# Patient Record
Sex: MALE | Race: WHITE | NOT HISPANIC OR LATINO | Employment: OTHER | URBAN - METROPOLITAN AREA
[De-identification: names, ages, dates, MRNs, and addresses within clinical notes are randomized per-mention and may not be internally consistent; named-entity substitution may affect disease eponyms.]

---

## 2020-08-03 ENCOUNTER — HOSPITAL ENCOUNTER (OUTPATIENT)
Dept: PHYSICAL THERAPY | Facility: CLINIC | Age: 60
Setting detail: RECURRING SERIES
Discharge: HOME | End: 2020-12-14

## 2020-08-13 ENCOUNTER — OFFICE VISIT CONVERTED (OUTPATIENT)
Dept: ORTHOPEDIC SURGERY | Facility: CLINIC | Age: 60
End: 2020-08-13
Attending: ORTHOPAEDIC SURGERY

## 2020-09-24 ENCOUNTER — OFFICE VISIT CONVERTED (OUTPATIENT)
Dept: ORTHOPEDIC SURGERY | Facility: CLINIC | Age: 60
End: 2020-09-24
Attending: PHYSICIAN ASSISTANT

## 2020-11-05 ENCOUNTER — OFFICE VISIT CONVERTED (OUTPATIENT)
Dept: ORTHOPEDIC SURGERY | Facility: CLINIC | Age: 60
End: 2020-11-05
Attending: PHYSICIAN ASSISTANT

## 2020-11-24 ENCOUNTER — OFFICE VISIT (OUTPATIENT)
Dept: FAMILY MEDICINE CLINIC | Facility: CLINIC | Age: 60
End: 2020-11-24

## 2020-11-24 VITALS
BODY MASS INDEX: 39.19 KG/M2 | OXYGEN SATURATION: 97 % | TEMPERATURE: 98.2 F | RESPIRATION RATE: 18 BRPM | HEART RATE: 83 BPM | DIASTOLIC BLOOD PRESSURE: 82 MMHG | WEIGHT: 199.6 LBS | HEIGHT: 60 IN | SYSTOLIC BLOOD PRESSURE: 140 MMHG

## 2020-11-24 DIAGNOSIS — F51.01 PRIMARY INSOMNIA: ICD-10-CM

## 2020-11-24 DIAGNOSIS — Z63.79 STRESSFUL LIFE EVENTS AFFECTING FAMILY AND HOUSEHOLD: Primary | ICD-10-CM

## 2020-11-24 PROBLEM — M48.02 STENOSIS, CERVICAL SPINE: Status: ACTIVE | Noted: 2020-11-24

## 2020-11-24 PROBLEM — R97.20 ELEVATED PSA: Status: ACTIVE | Noted: 2020-11-24

## 2020-11-24 PROCEDURE — 99202 OFFICE O/P NEW SF 15 MIN: CPT | Performed by: FAMILY MEDICINE

## 2020-11-24 RX ORDER — FINASTERIDE 5 MG/1
5 TABLET, FILM COATED ORAL DAILY
COMMUNITY

## 2020-11-24 RX ORDER — TAMSULOSIN HYDROCHLORIDE 0.4 MG/1
1 CAPSULE ORAL DAILY
COMMUNITY

## 2020-11-24 RX ORDER — IBUPROFEN 800 MG/1
800 TABLET ORAL EVERY 6 HOURS PRN
COMMUNITY

## 2020-11-24 RX ORDER — ROSUVASTATIN CALCIUM 10 MG/1
10 TABLET, COATED ORAL DAILY
COMMUNITY

## 2020-11-24 RX ORDER — TRAZODONE HYDROCHLORIDE 50 MG/1
TABLET ORAL
Qty: 60 TABLET | Refills: 5 | Status: SHIPPED | OUTPATIENT
Start: 2020-11-24

## 2020-11-24 RX ORDER — ESCITALOPRAM OXALATE 10 MG/1
10 TABLET ORAL DAILY
Qty: 30 TABLET | Refills: 5 | Status: CANCELLED | OUTPATIENT
Start: 2020-11-24

## 2020-11-24 RX ORDER — ZOLPIDEM TARTRATE 12.5 MG/1
12.5 TABLET, FILM COATED, EXTENDED RELEASE ORAL NIGHTLY PRN
COMMUNITY

## 2020-11-24 RX ORDER — HYDROCODONE BITARTRATE AND ACETAMINOPHEN 7.5; 325 MG/1; MG/1
1 TABLET ORAL EVERY 6 HOURS PRN
COMMUNITY

## 2020-11-24 RX ORDER — CELECOXIB 200 MG/1
200 CAPSULE ORAL DAILY
COMMUNITY

## 2020-11-24 RX ORDER — OMEPRAZOLE 20 MG/1
20 CAPSULE, DELAYED RELEASE ORAL DAILY
COMMUNITY

## 2020-11-24 NOTE — PROGRESS NOTES
Subjective   Davis King is a 60 y.o. male.     Davis where to establish care.  Increase life stressors at home  [facility]. [facility] has [facility].      The following portions of the patient's history were reviewed and updated as appropriate: allergies, current medications, past family history, past medical history, past social history, past surgical history and problem list.    Patient Active Problem List   Diagnosis   • Elevated PSA   • Stenosis, cervical spine       Current Outpatient Medications on File Prior to Visit   Medication Sig Dispense Refill   • celecoxib (CeleBREX) 200 MG capsule Take 200 mg by mouth Daily.     • Diclofenac Sodium (VOLTAREN) 1 % gel gel Apply 4 g topically to the appropriate area as directed 4 (Four) Times a Day As Needed.     • finasteride (PROSCAR) 5 MG tablet Take 5 mg by mouth Daily.     • fluticasone (VERAMYST) 27.5 MCG/SPRAY nasal spray 2 sprays into the nostril(s) as directed by provider Daily.     • HYDROcodone-acetaminophen (NORCO) 7.5-325 MG per tablet Take 1 tablet by mouth Every 6 (Six) Hours As Needed for Moderate Pain .     • ibuprofen (ADVIL,MOTRIN) 800 MG tablet Take 800 mg by mouth Every 6 (Six) Hours As Needed for Mild Pain .     • metoprolol tartrate (LOPRESSOR) 25 MG tablet Take 25 mg by mouth 2 (Two) Times a Day. 12.5 mg qd     • omeprazole (priLOSEC) 20 MG capsule Take 20 mg by mouth Daily.     • rosuvastatin (CRESTOR) 10 MG tablet Take 10 mg by mouth Daily.     • tamsulosin (FLOMAX) 0.4 MG capsule 24 hr capsule Take 1 capsule by mouth Daily.     • zolpidem CR (AMBIEN CR) 12.5 MG CR tablet Take 12.5 mg by mouth At Night As Needed for Sleep.       No current facility-administered medications on file prior to visit.      Current outpatient and discharge medications have been reconciled for the patient.  Reviewed by: Rory Hawkins MD      Allergies   Allergen Reactions   • Nitroglycerin Nausea Only   • Tramadol Itching       Review of  Systems   Constitutional: Negative for activity change, appetite change, fatigue and fever.   HENT: Negative for ear pain, swollen glands and voice change.    Eyes: Negative for visual disturbance.   Respiratory: Negative for shortness of breath and wheezing.    Cardiovascular: Negative for chest pain and leg swelling.   Gastrointestinal: Negative for abdominal pain, blood in stool, constipation, diarrhea, nausea and vomiting.   Endocrine: Negative for polydipsia and polyuria.   Genitourinary: Negative for dysuria, frequency and hematuria.   Musculoskeletal: Negative for joint swelling, neck pain and neck stiffness.   Skin: Negative for rash and bruise.   Neurological: Negative for weakness, numbness and headache.   Psychiatric/Behavioral: Positive for sleep disturbance. Negative for suicidal ideas and depressed mood. The patient is nervous/anxious.      I have reviewed and confirmed the accuracy of the ROS as documented by the MA/LPN/RN Rory Hawkins MD      Objective   Vitals:    11/24/20 1407   BP: 140/82   Pulse: 83   Resp: 18   Temp: 98.2 °F (36.8 °C)   SpO2: 97%     Physical Exam  Constitutional:       Appearance: He is well-developed.   HENT:      Head: Normocephalic and atraumatic.      Right Ear: External ear normal.      Left Ear: External ear normal.      Nose: Nose normal.   Eyes:      Pupils: Pupils are equal, round, and reactive to light.   Neck:      Musculoskeletal: Normal range of motion and neck supple.   Cardiovascular:      Rate and Rhythm: Normal rate and regular rhythm.      Heart sounds: Normal heart sounds.   Pulmonary:      Effort: Pulmonary effort is normal.      Breath sounds: Normal breath sounds.   Abdominal:      General: Bowel sounds are normal.      Palpations: Abdomen is soft.   Musculoskeletal: Normal range of motion.   Skin:     General: Skin is warm and dry.   Neurological:      Mental Status: He is alert and oriented to person, place, and time.   Psychiatric:          Behavior: Behavior normal.         Thought Content: Thought content normal.         Judgment: Judgment normal.       I wore protective equipment throughout this patient encounter to include mask and eye protection. Hand hygiene was performed before donning protective equipment and after removal when leaving the room.    Assessment/Plan .  Problem List Items Addressed This Visit     None      Visit Diagnoses     Stressful life events affecting family and household    -  Primary    Relevant Medications    zolpidem CR (AMBIEN CR) 12.5 MG CR tablet    traZODone (DESYREL) 50 MG tablet    Primary insomnia        Relevant Medications    traZODone (DESYREL) 50 MG tablet       Consider starting SSRI if sx persist.  Findings discussed. All questions answered.  Medication and medication adverse effects discussed.  Drug education given and explained to patient. Patient verbalized understanding.  Continue ambien, add trazodone.  Follow-up for routine health maintenance as directed

## 2021-05-10 NOTE — H&P
History and Physical      Patient Name: Davis King   Patient ID: [id number]   Sex: Male   YOB: 1960        Visit Date: August 13, 2020    Provider: Srinivasan Mitchell MD   Location: [facility]   Location Address: [address] [place] [address]  [id number]   Location Phone: [phone]          Chief Complaint  · left shoulder pain      History Of Present Illness  Davis King is a 59 year old /White male who presents today to [facility].      The patient presents today for left shoulder evaluation today. He is having cracking and popping in his shoulder with mild pain. [facility]       Past Medical History  Cervicalgia; Insomnia; Pain, Back; Pain, Cervical Spine; Pain, Chest; Pain, Chronic Pain Syndrome; Pain, Thoracic Spine; Prostate Disorder; Restless leg syndrome         Past Surgical History  ACL Reconstruction; biceps tendon repair; Colonoscopy; Metal implants; Prostate Biopsy         Medication List  Ambien CR 12.5 mg oral tablet,ext release multiphase; Celebrex 200 mg oral capsule; finasteride 5 mg oral tablet; Glucosamine Chondroitin PLUS 910-969-63-54 mg oral capsule; hydrocodone-acetaminophen 7.5-300 mg oral tablet; ibuprofen 800 mg oral tablet; metoprolol succinate 25 mg oral tablet extended release 24 hr; omeprazole 20 mg oral capsule,delayed release(DR/EC); rosuvastatin 10 mg oral tablet; tamsulosin 0.4 mg oral capsule         Allergy List  NO KNOWN DRUG ALLERGIES; tramadol         Family Medical History  Cancer, Unspecified; Diabetes, unspecified type; Spine Problems; Osteoporosis         Social History  Alcohol Use (Current some day); lives with spouse; .; Recreational Drug Use (Never); Retired.; Tobacco (Never)         Review of Systems  · Constitutional  o Denies  o : fever, chills, weight loss  · Cardiovascular  o Denies  o : chest pain, shortness of breath  · Gastrointestinal  o Denies  o : liver  disease, heartburn, nausea, blood in stools  · Genitourinary  o Denies  o : painful urination, blood in urine  · Integument  o Denies  o : rash, itching  · Neurologic  o Denies  o : headache, weakness, loss of consciousness  · Musculoskeletal  o Denies  o : painful, swollen joints  · Psychiatric  o Denies  o : drug/alcohol addiction, anxiety, depression      Vitals  Date Time BP Position Site L\R Cuff Size HR RR TEMP (F) WT  HT  BMI kg/m2 BSA m2 O2 Sat        08/13/2020 08:48 AM      56 - R   200lbs 2oz 6'   27.14 2.15 98 %          Physical Examination  · Constitutional  o Appearance  o : well developed, well-nourished, no obvious deformities present  · Head and Face  o Head  o :   § Inspection  § : normocephalic  o Face  o :   § Inspection  § : no facial lesions  · Eyes  o Conjunctivae  o : conjunctivae normal  o Sclerae  o : sclerae white  · Ears, Nose, Mouth and Throat  o Ears  o :   § External Ears  § : appearance within normal limits  § Hearing  § : intact  o Nose  o :   § External Nose  § : appearance normal  · Neck  o Inspection/Palpation  o : normal appearance  o Range of Motion  o : full range of motion  · Respiratory  o Respiratory Effort  o : breathing unlabored  o Inspection of Chest  o : normal appearance  o Auscultation of Lungs  o : no audible wheezing or rales  · Cardiovascular  o Heart  o : regular rate  · Gastrointestinal  o Abdominal Examination  o : soft and non-tender  · Skin and Subcutaneous Tissue  o General Inspection  o : intact, no rashes  · Psychiatric  o General  o : Alert and oriented x3  o Judgement and Insight  o : judgment and insight intact  o Mood and Affect  o : mood normal, affect appropriate  · Left Shoulder  o Inspection  o : Tender over the anterior lateral shoulder, FE: 175, AB: 145, ER: 90 crepitus with ROM, IR: 50, ER at he side is 80, reaches to T12, negative lift off, 4 + supraspinatus, positive impingement signs, negative cross arm adduction.   · Injection  Note/Aspiration Note  o Site  o : left shoulder  o Procedure  o : Procedure: After educating the patient, patient gave consent for procedure. After using Chloraprep, the joint space was injected. The patient tolerated the procedure well.  o Medication  o : 80 mg of DepoMedrol with 9cc of 1% Lidocaine              Assessment  · AC (acromioclavicular) arthritis on the left shoulder     716.91/M19.019  · Bursitis of the left shoulder     727.3/M71.9  · Left shoulder pain, unspecified chronicity     719.41/M25.512  · Tendinitis of the right shoulder     726.90/M77.9  · Impingement syndrome of shoulder     726.2/M75.40      Plan  · Orders  o Depo-Medrol injection 80mg () - - 08/13/2020   Lot [id number] Exp 06 2021 [employer] Administered by JESSICA MARTINEZ MD   o Shoulder Intra-articular Injection without US Guidance [facility] ([id number]) - - 08/13/2020   Lot [id number] Exp 08 01 2021 [employer] Administered by JESSICA MARTINEZ MD   · Medications  o Medications have been Reconciled  o Transition of Care or Provider Policy  · Instructions  o Dr. Martinez saw and examined the patient and agrees with plan.   o Reviewed the patient's Past Medical, Social, and Family history as well as the ROS at today's visit, no changes.  o Call or return if worsening symptoms.  o The above service was scribed by Gera Vergara on my behalf and I attest to the accuracy of the note. jsb  o We discussed that injections and therapy should help this a lot. We would like him to continue PT and do some home exercises. The patient wishes to proceed with an injection today and tolerated this well.   o Electronically Identified Patient Education Materials Provided Electronically  · Referrals  o ID: [id number] Date: 08/13/2020 Type: Inbound  Specialty: Orthopedic Surgery            Electronically Signed by: Javed Vergara - , Other -Author on August 13, 2020 01:01:50 PM  Electronically Co-signed by: Jessica Martinez MD -Reviewer  on August 13, 2020 09:13:48 PM

## 2021-05-13 NOTE — PROGRESS NOTES
"   Progress Note      Patient Name: Davis King   Patient ID: [id number]   Sex: Male   YOB: 1960    Referring Provider: Srinivasan Mitchell MD    Visit Date: November 5, 2020    Provider: Wally Fraga PA-C   Location: [facility]   Location Address: [address] [place] [address]  [id number]   Location Phone: [phone]          Chief Complaint  · Left shoulder pain      History Of Present Illness  Davis King is a 59 year old /White male who presents today to [facility]. Patient presents for follow-up evaluation of left AC arthritis, tendinitis and bursitis. Patient states he is \"slowly getting better \". He states he has decreased popping in his shoulder, he states he has less pain at night and is able to sleep better. Patient states he has been attending physical therapy but states they are about to release him and he will continue home exercises. Patient states injection he has received in the past did not help and is not requesting a new injection. No new complaints today.       Past Medical History  Cervicalgia; Insomnia; Pain, Back; Pain, Cervical Spine; Pain, Chest; Pain, Chronic Pain Syndrome; Pain, Thoracic Spine; Prostate Disorder; Restless leg syndrome         Past Surgical History  ACL Reconstruction; biceps tendon repair; Colonoscopy; Metal implants; Prostate Biopsy         Medication List  Ambien CR 12.5 mg oral tablet,ext release multiphase; Celebrex 200 mg oral capsule; finasteride 5 mg oral tablet; Glucosamine Chondroitin PLUS 403-546-43-54 mg oral capsule; hydrocodone-acetaminophen 7.5-300 mg oral tablet; ibuprofen 800 mg oral tablet; metoprolol succinate 25 mg oral tablet extended release 24 hr; omeprazole 20 mg oral capsule,delayed release(DR/EC); rosuvastatin 10 mg oral tablet; tamsulosin 0.4 mg oral capsule         Allergy List  NO KNOWN DRUG ALLERGIES; tramadol       Allergies Reconciled  Family Medical History  Cancer, Unspecified; " Diabetes, unspecified type; Spine Problems; Osteoporosis         Social History  Alcohol Use (Current some day); lives with spouse; .; Recreational Drug Use (Never); Retired.; Tobacco (Never)         Review of Systems  · Constitutional  o Denies  o : fever, chills, weight loss  · Cardiovascular  o Denies  o : chest pain, shortness of breath  · Gastrointestinal  o Denies  o : liver disease, heartburn, nausea, blood in stools  · Genitourinary  o Denies  o : painful urination, blood in urine  · Integument  o Denies  o : rash, itching  · Neurologic  o Denies  o : headache, weakness, loss of consciousness  · Musculoskeletal  o Denies  o : painful, swollen joints  · Psychiatric  o Denies  o : drug/alcohol addiction, anxiety, depression      Vitals  Date Time BP Position Site L\R Cuff Size HR RR TEMP (F) WT  HT  BMI kg/m2 BSA m2 O2 Sat FR L/min FiO2 HC       11/05/2020 10:39 AM      55 - R   204lbs 16oz 6'   27.8 2.17 98 %            Physical Examination  · Constitutional  o Appearance  o : well developed, well-nourished, no obvious deformities present  · Head and Face  o Head  o :   § Inspection  § : normocephalic  o Face  o :   § Inspection  § : no facial lesions  · Eyes  o Conjunctivae  o : conjunctivae normal  o Sclerae  o : sclerae white  · Ears, Nose, Mouth and Throat  o Ears  o :   § External Ears  § : appearance within normal limits  § Hearing  § : intact  o Nose  o :   § External Nose  § : appearance normal  · Neck  o Inspection/Palpation  o : normal appearance  o Range of Motion  o : full range of motion  · Respiratory  o Respiratory Effort  o : breathing unlabored  o Inspection of Chest  o : normal appearance  o Auscultation of Lungs  o : no audible wheezing or rales  · Cardiovascular  o Heart  o : regular rate  · Gastrointestinal  o Abdominal Examination  o : soft and non-tender  · Skin and Subcutaneous Tissue  o General Inspection  o : intact, no rashes  · Psychiatric  o General  o : Alert and oriented  x3  o Judgement and Insight  o : judgment and insight intact  o Mood and Affect  o : mood normal, affect appropriate  · Left Shoulder  o Inspection  o : Skin is intact, no erythema, no ecchymosis, no swelling, no signs of infection. Nontender to palpation, forward elevation 175, abduction 145, external rotation with abduction 90, crepitus with range of motion. Internal rotation 50, external rotation at side is 80, internal rotation to T12, negative liftoff test, 4+ supraspinatus, positive impingement signs, negative crossarm adduction.   · Imaging  o Imaging  o : MRI left shoulder, 3/25/2020, impression: 1 tendinopathy of the supraspinatus, infraspinatus and subscapularis of the left shoulder with some degenerative changes at their attachment on the humerus as described above. 2. Degenerative changes of the left acromioclavicular joint.           Assessment  · Left AC (acromioclavicular) arthritis     716.91/M19.019  · Left shoulder bursitis     727.3/M71.9  · Left shoulder pain, unspecified chronicity     719.41/M25.512  · Left shoulder tendinitis     726.90/M77.9      Plan  · Medications  o Medications have been Reconciled  o Transition of Care or Provider Policy  · Instructions  o Reviewed the patient's Past Medical, Social, and Family history as well as the ROS at today's visit, no changes.  o Call or return if worsening symptoms.  o Follow up as needed.  o Discussed treatment options with patient, patient, declined offer for steroid injection today. Patient was given order for pain cream, use as directed. Continue physical therapy/home exercise program. Follow-up as needed.  · Referrals  o ID: [id number] Date: 08/13/2020 Type: Inbound  Specialty: Orthopedic Surgery            Electronically Signed by: SHREYAS Multani-PARRIS -Author on November 5, 2020 11:50:34 AM  Electronically Co-signed by: Srinivasan Mitchell MD -Reviewer on November 5, 2020 08:16:08 PM

## 2021-05-13 NOTE — PROGRESS NOTES
"   Progress Note      Patient Name: Davis King   Patient ID: [id number]   Sex: Male   YOB: 1960        Visit Date: September 24, 2020    Provider: Wally Fraga PA-C   Location: [facility]   Location Address: [address] [place] [address]  [id number]   Location Phone: [phone]          Chief Complaint  · Left shoulder pain      History Of Present Illness  Davis King is a 59 year old /White male who presents today to [facility]. Patient presents for follow-up evaluation of left shoulder tendinitis/bursitis and AC arthritis. Patient was last evaluated by Dr. Mitchell on 8/13/2020 and he received a steroid injection in the shoulder and was advised to start physical therapy. Patient states the injection helped his shoulder some he states he still has some popping in the shoulder he states physical therapy is helping and is \"productive \". He states he has increased muscle mass and range of motion in the left shoulder. No new complaints today.       Past Medical History  Cervicalgia; Insomnia; Pain, Back; Pain, Cervical Spine; Pain, Chest; Pain, Chronic Pain Syndrome; Pain, Thoracic Spine; Prostate Disorder; Restless leg syndrome         Past Surgical History  ACL Reconstruction; biceps tendon repair; Colonoscopy; Metal implants; Prostate Biopsy         Medication List  Ambien CR 12.5 mg oral tablet,ext release multiphase; Celebrex 200 mg oral capsule; finasteride 5 mg oral tablet; Glucosamine Chondroitin PLUS 536-208-09-54 mg oral capsule; hydrocodone-acetaminophen 7.5-300 mg oral tablet; ibuprofen 800 mg oral tablet; metoprolol succinate 25 mg oral tablet extended release 24 hr; omeprazole 20 mg oral capsule,delayed release(DR/EC); rosuvastatin 10 mg oral tablet; tamsulosin 0.4 mg oral capsule         Allergy List  NO KNOWN DRUG ALLERGIES; tramadol       Allergies Reconciled  Family Medical History  Cancer, Unspecified; Diabetes, unspecified type; Spine " Problems; Osteoporosis         Social History  Alcohol Use (Current some day); lives with spouse; .; Recreational Drug Use (Never); Retired.; Tobacco (Never)         Review of Systems  · Constitutional  o Denies  o : fever, chills, weight loss  · Cardiovascular  o Denies  o : chest pain, shortness of breath  · Gastrointestinal  o Denies  o : liver disease, heartburn, nausea, blood in stools  · Genitourinary  o Denies  o : painful urination, blood in urine  · Integument  o Denies  o : rash, itching  · Neurologic  o Denies  o : headache, weakness, loss of consciousness  · Musculoskeletal  o Denies  o : painful, swollen joints  · Psychiatric  o Denies  o : drug/alcohol addiction, anxiety, depression      Vitals  Date Time BP Position Site L\R Cuff Size HR RR TEMP (F) WT  HT  BMI kg/m2 BSA m2 O2 Sat        09/24/2020 09:34 AM      67 - R   204lbs 6oz 6'   27.72 2.17 98 %          Physical Examination  · Constitutional  o Appearance  o : well developed, well-nourished, no obvious deformities present  · Head and Face  o Head  o :   § Inspection  § : normocephalic  o Face  o :   § Inspection  § : no facial lesions  · Eyes  o Conjunctivae  o : conjunctivae normal  o Sclerae  o : sclerae white  · Ears, Nose, Mouth and Throat  o Ears  o :   § External Ears  § : appearance within normal limits  § Hearing  § : intact  o Nose  o :   § External Nose  § : appearance normal  · Neck  o Inspection/Palpation  o : normal appearance  o Range of Motion  o : full range of motion  · Respiratory  o Respiratory Effort  o : breathing unlabored  o Inspection of Chest  o : normal appearance  o Auscultation of Lungs  o : no audible wheezing or rales  · Cardiovascular  o Heart  o : regular rate  · Gastrointestinal  o Abdominal Examination  o : soft and non-tender  · Skin and Subcutaneous Tissue  o General Inspection  o : intact, no rashes  · Psychiatric  o General  o : Alert and oriented x3  o Judgement and Insight  o : judgment and  insight intact  o Mood and Affect  o : mood normal, affect appropriate  · Left Shoulder  o Inspection  o : Skin is intact, no erythema, no ecchymosis, no swelling, no signs of infection. Nontender to palpation, forward elevation 175, abduction 145, external rotation with abduction 90, crepitus with range of motion. Internal rotation 50, external rotation at side is 80, internal rotation to T12, negative liftoff test, 4+ supraspinatus, positive impingement signs, negative crossarm adduction.  · Imaging  o Imaging  o : MRI left shoulder, 3/25/2020, impression: 1 tendinopathy of the supraspinatus, infraspinatus and subscapularis of the left shoulder with some degenerative changes at their attachment on the humerus as described above. 2. Degenerative changes of the left acromioclavicular joint.          Assessment  · Left shoulder AC (acromioclavicular) arthritis     716.91/M19.019  · Left shoulder bursitis     727.3/M71.9  · Left shoulder pain, unspecified chronicity     719.41/M25.512  · Left shoulder tendinitis     726.90/M77.9      Plan  · Medications  o Medications have been Reconciled  o Transition of Care or Provider Policy  · Instructions  o Reviewed the patient's Past Medical, Social, and Family history as well as the ROS at today's visit, no changes.  o Call or return if worsening symptoms.  o Follow up in 6-8 weeks.  o Patient was advised to continue physical therapy new orders written. Follow-up in 6 to 8 weeks for reevaluation.  · Referrals  o ID: [id number] Date: 08/13/2020 Type: Inbound  Specialty: Orthopedic Surgery            Electronically Signed by: SHREYAS Multani-PARRIS -Author on September 24, 2020 11:27:05 AM  Electronically Co-signed by: Srinivasan Mitchell MD -Reviewer on September 24, 2020 08:16:00 PM

## 2021-05-14 VITALS — HEART RATE: 67 BPM | WEIGHT: 204.37 LBS | HEIGHT: 72 IN | BODY MASS INDEX: 27.68 KG/M2 | OXYGEN SATURATION: 98 %

## 2021-05-14 VITALS — WEIGHT: 205 LBS | HEART RATE: 55 BPM | OXYGEN SATURATION: 98 % | HEIGHT: 72 IN | BODY MASS INDEX: 27.77 KG/M2

## 2021-05-15 VITALS — WEIGHT: 200.12 LBS | HEIGHT: 72 IN | OXYGEN SATURATION: 98 % | HEART RATE: 56 BPM | BODY MASS INDEX: 27.1 KG/M2
